# Patient Record
Sex: FEMALE | Race: BLACK OR AFRICAN AMERICAN | URBAN - METROPOLITAN AREA
[De-identification: names, ages, dates, MRNs, and addresses within clinical notes are randomized per-mention and may not be internally consistent; named-entity substitution may affect disease eponyms.]

---

## 2021-07-07 ENCOUNTER — OFFICE VISIT (OUTPATIENT)
Dept: INTEGRATIVE MEDICINE | Facility: CLINIC | Age: 51
End: 2021-07-07
Payer: COMMERCIAL

## 2021-07-07 VITALS
DIASTOLIC BLOOD PRESSURE: 104 MMHG | HEIGHT: 69 IN | BODY MASS INDEX: 42.21 KG/M2 | HEART RATE: 78 BPM | SYSTOLIC BLOOD PRESSURE: 164 MMHG | WEIGHT: 285 LBS | OXYGEN SATURATION: 99 %

## 2021-07-07 DIAGNOSIS — N92.6 IRREGULAR PERIODS: ICD-10-CM

## 2021-07-07 DIAGNOSIS — E28.2 PCOS (POLYCYSTIC OVARIAN SYNDROME): ICD-10-CM

## 2021-07-07 DIAGNOSIS — I10 ESSENTIAL HYPERTENSION: ICD-10-CM

## 2021-07-07 PROBLEM — E66.01 MORBID OBESITY (HCC): Status: ACTIVE | Noted: 2021-05-19

## 2021-07-07 PROCEDURE — 3008F BODY MASS INDEX DOCD: CPT | Performed by: FAMILY MEDICINE

## 2021-07-07 PROCEDURE — 3077F SYST BP >= 140 MM HG: CPT | Performed by: FAMILY MEDICINE

## 2021-07-07 PROCEDURE — 3080F DIAST BP >= 90 MM HG: CPT | Performed by: FAMILY MEDICINE

## 2021-07-07 PROCEDURE — 99204 OFFICE O/P NEW MOD 45 MIN: CPT | Performed by: FAMILY MEDICINE

## 2021-07-07 RX ORDER — LOSARTAN POTASSIUM 25 MG/1
25 TABLET ORAL DAILY
COMMUNITY

## 2021-07-07 RX ORDER — CHLORTHALIDONE 25 MG/1
25 TABLET ORAL DAILY
COMMUNITY

## 2021-07-07 NOTE — PATIENT INSTRUCTIONS
I have complete xochitl in the body's ability to heal and transform. The products and items listed below (the “Products”)  and their claims have not been evaluated by the Food and Drug Administration.  Dietary products are not intended to treat, prevent, m Yield    Mazin-Inositol by Protocol for Life Balance    Directions: 1 tsp twice daily  Where to Purchase: [email or web address]  This is our [facility] online dispensary for vitamins and supplements where you can receive a 10% discount

## 2021-07-07 NOTE — PROGRESS NOTES
Lesley Rashid is a 45year old female.   Patient presents with:  New Patient  Integrative Medicine Consult: weight management, high bp and hormone unbalance       HPI:     Concerned about her high BP  Was seen by TCM provider and was able to wean off of activity: Not on file    Other Topics      Concerns:        Not on file    Social History Narrative      Working from home      Lives with son (aged 20 yo - 7/2021)    Social Determinants of Health  Financial Resource Strain:       Difficulty of Paying Corrina Gait: Gait is intact. Psychiatric:         Mood and Affect: Affect normal.         ASSESSMENT AND PLAN:     No results found for any previous visit. No results found.     1. BMI 40.0-44.9, adult ([facility]  - MEDICAL NUTRITIONAL THERAPY ([place]) - INTER their claims have not been evaluated by the Food and Drug Administration. Dietary products are not intended to treat, prevent, mitigate or cure disease.  Ultimately, you must draw your own conclusion as to the efficacy of the Product and immediately stop us This is our [facility] online dispensary for vitamins and supplements where you can receive a 10% discount off of supplement prices! Why: Inositol is a necessary component of all cellular membranes.  It is a member of the B-Vitamin family that